# Patient Record
(demographics unavailable — no encounter records)

---

## 2025-01-09 NOTE — HISTORY OF PRESENT ILLNESS
[Patient is stable] : patient is stable [Spouse] : spouse [Family Member] : family member [Formal Caregiver] : formal caregiver [FreeTextEntry1] : unsteady gait, cognitive impariment [FreeTextEntry2] : RYAN COLON is a 96 year M with PMH significant for advanced dementia afib, unsteady gait (no major falls in last 6 months, on anticoag), PE.  Recently with swollen ankle.  Pt seen for acute visit for ankle. L ankle swelling since over the weekend. Monday couldn't touch it, now is putting more pressure.  He wears braces to walk. Aide was wrapping it but was told to stop. Giving him tylenol. Wife concerned pt seems depressed. Appetite and sleep are ok, would like to try medication Hearing: Pt is [place] but has been worse, she'd like someone to clean his ears. Urine, stools, behavior otherwise are at [facility].  -Cognition and memory: advanced dementia: denies wandering, denies hallucination, has unsteady gait and 2 person assist to stand, ambulates with assistance and walker -Functional Status: patient dependent for ADLs and iADLs,  -good appetite -denies dysphagia, it is discussed and recommend asp precautions, no coughing while eating  -weight stable -BM pattern: hx of hemorrhoids, controlled with senna as needed -Urinary: denies increased frequency, pain with urination, reports history of blood in urine  -Incontinence: incontinent of bowl and bladder, uses diaper -Skin: Denies bedsores/skin breakdown denies erythema, or new rashes -Bedbound/WC during daytime  -Ambulates with walker, needs assistance transferring and someone to folow -Falls: none recently,  -Behavior is calm, as per caregiver -Sensory deficits: [place] uses, hearing aids, wears glasses -Smoking status: pipe smoker for 30 years, quit 40 years ago  _____ Support at home: wife (93yo is proxy, makes all decisions) has 3 children: 1: Daisy in [place] (next to call if can't reach wite),  2nd: Marisol (lives nearby), 3rd; Qiana (can share information with but cannot make decisions) has 2 aides that do 24/7 coverage DME: reclining WC, hosp bed, anya recliner  Does not want to follow with any specialist:

## 2025-01-09 NOTE — COUNSELING
[Continue diet as tolerated] : continue diet as tolerated based on goals of care [Non - Smoker] : non-smoker

## 2025-01-09 NOTE — REASON FOR VISIT
[Home] : at home, [unfilled] , at the time of the visit. [Other Location: e.g. Home (Enter Location, City,State)___] : at [unfilled] [Partner] : partner [This encounter was initiated by telehealth (audio with video) and converted to telephone (audio only) due to technical difficulties.] : This encounter was initiated by telehealth (audio with video) and converted to telephone (audio only) due to technical difficulties. [Follow-Up] : a follow-up visit [Spouse] : spouse [Family Member] : family member [Formal Caregiver] : formal caregiver [Pre-Visit Preparation] : pre-visit preparation was done [FreeTextEntry3] : wife [FreeTextEntry2] : Chart Review

## 2025-01-09 NOTE — PHYSICAL EXAM
[No Acute Distress] : no acute distress [Normal Sclera/Conjunctiva] : normal sclera/conjunctiva [EOMI] : extra ocular movement intact [Normal Outer Ear/Nose] : the ears and nose were normal in appearance [No Respiratory Distress] : no respiratory distress [No Accessory Muscle Use] : no accessory muscle use [No Edema] : there was no peripheral edema [No Rash] : no rash [Foot Ulcers] : no foot ulcers [de-identified] : elderly male lying in bed, unable to answer/verbalize, calm and comfortable, arousable but nonverbal [de-identified] : Bl hearing aids [de-identified] : +swelling over R medial malleolus [de-identified] : unable to perform commands, ambulates with 2 person assist up and prompting with support; wears BL leg brace for years [de-identified] : advanced cognitive impairment, not responding to commands, no overt motor deficits or asymmetry [de-identified] : arousable not oriented

## 2025-01-22 NOTE — HISTORY OF PRESENT ILLNESS
[Home] : at home, [unfilled] , at the time of the visit. [Other Location: e.g. Home (Enter Location, City,State)___] : at [unfilled] [FreeTextEntry3] : Wife gives consent for treatment [FreeTextEntry8] : 97 advanced dementia afib, GERD, arterial insufficiency, depression, HLD, unsteady gait (no major falls in last 6 mon), PE on AC now with several days of intermittent wheeze, congestion, URI symptoms, noted today to have a fever 100.8 now more somnolent and lethargic.  Cough with sputum.  No tachypnea or obvious increased work of breathing per family. Abx sent z-pack but not yet started.  RVP performed today but no resutls yet.  Ate normally last night, but less today, able to dress himself today.  Cough +sputum.  Baseline nonverbal but tracks people in room, during exam patient is minimally arousable.   Last took albuterol neb 2.5 hrs ago Wife wishes to keep patient at home

## 2025-01-22 NOTE — ASSESSMENT
[Assessment and Treatment] : [facility] Outcome: Assessment and Treatment [CP formulary medication given] : CP formulary medication given [IV fluids given] : IV fluids given [Home medication regimen/care plan changed and patient remained home] : Home medication regimen/care plan changed and patient remained home. [Yes] : If the [facility] evaluation had not been available would you have advised the patient to go to the ER?  Yes [FreeTextEntry2] : Note, initial BP 89/50, quickly normalized on subsequent readings. 128/82, 76, 18-22, nonlabored, 95%, 100F PO.  FS 140s.   AAOx0, arouses to noxious stimulation.  GCS 6 Midline trachea Mildly dry MM Mild exp wheeze but no crackles/rales.  Normal/nonlabored respiratory effort Abd soft NTND LE without swelling Normal skin turgor [FreeTextEntry1] : Febrile illness/URI vs PNA now with lethargy.  Satting well and work of breathing is appropriate; wife firm that patient would want to be kept at home for treatment of current illness

## 2025-01-22 NOTE — PLAN
[FreeTextEntry1] : --1L NS bolus x 1 --Tylenol 1g IV x 1 --Reassess mental status after above, discuss possible transport with wife as patient's obtundation may make hydration and medication administration unrealistic --Family amenable to this plan --If patient stays home, will need labs (CBC, CMP) and CXR in home; will discuss c HC team to order and perform 4 hr  f/u

## 2025-01-30 NOTE — HISTORY OF PRESENT ILLNESS
[Home] : at home, [unfilled] , at the time of the visit. [Medical Office: ([facility])___] : at the medical office located in  [Formal Caregiver] : formal caregiver [Verbal consent obtained from patient] : the patient, [unfilled] [FreeTextEntry3] : SAUL [Discharged with Nursing Homecare] : Discharged with nursing homecare [Elaine] : Post-hospitalization from [facility] [Pneumonia] : Pneumonia [Other: _____] : [unfilled] [Yes] : Yes [Admitted on: ___] : The patient was admitted on [unfilled] [Discharged on ___] : discharged on [unfilled] [Discharge Summary] : discharge summary [Pertinent Labs] : pertinent labs [Radiology Findings] : radiology findings [Discharge Med List] : discharge medication list [Med Reconciliation] : medication reconciliation has been completed [Patient Contacted By: ____] : and contacted by [unfilled] [FreeTextEntry2] : Posthospital discharge telehealth visit.  Patient's home health aide is present and assisting with the visit and providing information.  Patient is a 97-year-old man with advanced dementia, at baseline at the bound and nonverbal. He lives with his wife, who is the decision-maker, as well as 24-hour care.  Patient was admitted with sepsis secondary to influenza A, also found to have a UTI Pseudomonas, as well as possible aspiration pneumonia which he was treated.  Patient has been having swallow evaluation, he was pocketing food taking very little in orally.  Goals of care conversation reviewed per hospital notes, wife did not want a feeding tube, rather decided on pleasure feeding understanding he may not eat a lot.  I do not see advanced directives in the chart.  He was discharged back on his regular home medications.  Patient is on our Housecalls program  Per his home health aide, since coming home yesterday he has been sleeping.  He took some food and drink, but overall is eating significantly less than his baseline.  He is not on oxygen.  He is coughing.  On video, elderly male asleep in bed.  He is no distress apparent.  Does have a frequent wet cough.

## 2025-01-30 NOTE — ASSESSMENT
[FreeTextEntry1] : 97-year-old male, with advanced dementia, at baseline bedbound, nonverbal with functional quadriplegia.  Post admission for flu A with sepsis, pneumonia, as well as UTI.  Patient more lethargic, like he was when he went to the hospital, even compared to his baseline.  With decreased p.o. intake.  Goals of care regarding feeding were addressed in the hospital, decision was for him to do pleasure feeding whenever he can, and no feeding tube.  Very reasonable. Unclear though what the decision will be if he stops eating at home.  Per the home health aide, the patient's wife, who is currently sleeping, is the one who makes decisions And does so on a day-to-day basis it seems I also did not see advanced directives in the [facility] chart. Patient's aide seems to think that the wife does understand he may not recover from this severe setback given overall age and comorbidities.  Aide said that there is hospice paperwork in the home, but they are still deciding. I have also messaged the [facility] and housecalls teams, as these goals of care and hopefully home hospice needs to be addressed soon.  As he certainly may not recover.  I am sending over albuterol to use 2-3 times a day they already have a nebulizer.

## 2025-01-30 NOTE — PHYSICAL EXAM
[08642 - High Complexity requires an extensive number of possible diagnoses and/or the management options, extensive complexity of the medical data (tests, etc.) to be reviewed, and a high risk of significant complications, morbidity, and/or mortality as w] : High Complexity